# Patient Record
Sex: FEMALE | Race: BLACK OR AFRICAN AMERICAN
[De-identification: names, ages, dates, MRNs, and addresses within clinical notes are randomized per-mention and may not be internally consistent; named-entity substitution may affect disease eponyms.]

---

## 2017-03-24 ENCOUNTER — HOSPITAL ENCOUNTER (INPATIENT)
Dept: HOSPITAL 84 - D.M2 | Age: 57
LOS: 2 days | Discharge: HOME | DRG: 312 | End: 2017-03-26
Attending: FAMILY MEDICINE | Admitting: FAMILY MEDICINE
Payer: COMMERCIAL

## 2017-03-24 VITALS — DIASTOLIC BLOOD PRESSURE: 96 MMHG | SYSTOLIC BLOOD PRESSURE: 142 MMHG

## 2017-03-24 VITALS — BODY MASS INDEX: 28.7 KG/M2 | HEIGHT: 68 IN | WEIGHT: 189.4 LBS | BODY MASS INDEX: 28.7 KG/M2

## 2017-03-24 VITALS — DIASTOLIC BLOOD PRESSURE: 91 MMHG | SYSTOLIC BLOOD PRESSURE: 153 MMHG

## 2017-03-24 DIAGNOSIS — R94.31: ICD-10-CM

## 2017-03-24 DIAGNOSIS — I10: ICD-10-CM

## 2017-03-24 DIAGNOSIS — I07.1: ICD-10-CM

## 2017-03-24 DIAGNOSIS — Z72.0: ICD-10-CM

## 2017-03-24 DIAGNOSIS — R09.89: ICD-10-CM

## 2017-03-24 DIAGNOSIS — E87.6: ICD-10-CM

## 2017-03-24 DIAGNOSIS — M32.9: ICD-10-CM

## 2017-03-24 DIAGNOSIS — R55: Primary | ICD-10-CM

## 2017-03-24 LAB
ANION GAP SERPL CALC-SCNC: 12.5 MMOL/L (ref 8–16)
APTT BLD: 30.7 SECONDS (ref 22.8–39.4)
BASOPHILS NFR BLD AUTO: 0.2 % (ref 0–2)
BUN SERPL-MCNC: 21 MG/DL (ref 7–18)
CALCIUM SERPL-MCNC: 8.6 MG/DL (ref 8.5–10.1)
CHLORIDE SERPL-SCNC: 105 MMOL/L (ref 98–107)
CK MB SERPL-MCNC: 0.5 U/L (ref 0–3.6)
CK SERPL-CCNC: 83 UL (ref 21–215)
CO2 SERPL-SCNC: 27.6 MMOL/L (ref 21–32)
CREAT SERPL-MCNC: 0.9 MG/DL (ref 0.6–1.3)
EOSINOPHIL NFR BLD: 3.4 % (ref 0–7)
ERYTHROCYTE [DISTWIDTH] IN BLOOD BY AUTOMATED COUNT: 17.3 % (ref 11.5–14.5)
GLUCOSE SERPL-MCNC: 123 MG/DL (ref 74–106)
HCT VFR BLD CALC: 32.4 % (ref 36–48)
HGB BLD-MCNC: 10.5 G/DL (ref 12–16)
IMM GRANULOCYTES NFR BLD: 0.2 % (ref 0–5)
INR PPP: 1.03 (ref 0.85–1.17)
LYMPHOCYTES NFR BLD AUTO: 41.8 % (ref 15–50)
MCH RBC QN AUTO: 23.4 PG (ref 26–34)
MCHC RBC AUTO-ENTMCNC: 32.4 G/DL (ref 31–37)
MCV RBC: 72.2 FL (ref 80–100)
MONOCYTES NFR BLD: 2.4 % (ref 2–11)
NEUTROPHILS NFR BLD AUTO: 52 % (ref 40–80)
OSMOLALITY SERPL CALC.SUM OF ELEC: 286 MOSM/KG (ref 275–300)
PLATELET # BLD: 387 10X3/UL (ref 130–400)
PMV BLD AUTO: 8.6 FL (ref 7.4–10.4)
POTASSIUM SERPL-SCNC: 3.1 MMOL/L (ref 3.5–5.1)
PROTHROMBIN TIME: 13.3 SECONDS (ref 11.6–15)
RBC # BLD AUTO: 4.49 10X6/UL (ref 4–5.4)
SODIUM SERPL-SCNC: 142 MMOL/L (ref 136–145)
TROPONIN I SERPL-MCNC: < 0.017 NG/ML (ref 0–0.06)
WBC # BLD AUTO: 6.2 10X3/UL (ref 4.8–10.8)

## 2017-03-24 NOTE — NUR
ATTEMPTED TO SITE PT PIV X2 STICKS. NO SUCCESS. ASKED ANOTHER RN ON FLOOR TO
TRY.
 
TOLD PT ABOUT DR ODOM ORDERING MEDICATION SOON. SHE IS VERY UPSET. SCREAMING
AT ME, NURSING STAFF. AND HER SISTER IN ROOM. TOLD PT AS SOON AS MEDICATION
WAS AVAILABLE I WOULD GIVE IT.

## 2017-03-24 NOTE — NUR
DR ODOM ORDERED TYLENOL FOR PT. WENT TO PULL AND NO TYLENOL IN EITHER PYXIS.
CALLED PHARM SPOKE TO RICH TO REFILL

## 2017-03-24 NOTE — HP
PATIENT: LOUANN KENNY                                  MEDICAL RECORD: [id number]
ACCOUNT: [id number]                                    LOCATION:[address]
: 60                                            ADMISSION DATE: 17
                                                         
 
                             HISTORY AND PHYSICAL EXAMINATION
 
 
HISTORY OF PRESENT ILLNESS:  A 57-year-old -American female presents with
a complaint of syncopal episode yesterday.  She states she was out 7-10 minutes.
 She called 911 when she aroused.  Reported a delayed response.  She began to
feel better, called back and cancel.  She does not remember any prior episodes. 
She has a history of systemic lupus erythematosus, hypertensive disorder.
 
CURRENT MEDICATIONS:  Folic acid, hydroxychloroquine,
losartan/hydrochlorothiazide 100/25 one daily, prednisone 5 mg daily,
promethazine 25 mg p.r.n. nausea.
 
ALLERGIES:  PENICILLIN AND SULFA DRUGS.
 
PAST SURGICAL HISTORY:  Denies any significant family history.
 
SOCIAL HISTORY:  Daily smoker, 3-4 cigarettes daily.
 
REVIEW OF SYSTEMS:
GENERAL:  No acute change in weight or appetite.
HEENT:  No cephalgia, visual changes, tinnitus, epistaxis or dysphagia.
CARDIOVASCULAR:  Denies chest pain, does admit recent syncopal episodes,
multiple risk factors.
PULMONARY:  Denies hemoptysis, denies night sweats.
GASTROINTESTINAL:  Denies hematemesis, hematochezia or melena.
GENITOURINARY:  Denies dysuria, denies change in frequency.
MUSCULOSKELETAL:  No acute changes.
ENDOCRINE:  Denies polyuria, polydipsia, or polyphagia.
 
PHYSICAL EXAMINATION:
VITAL SIGNS:  Height 5 feet 8 inches, weight 191 pounds, BMI is 29, blood
pressure 148/90.  Orthostatics obtained 144/90 supine, 126/78 seated, 140/90
standing, heart rate 64, respirations 18, and temperature 98.1.
GENERAL:  Alert, oriented, anxious.
HEENT:  Normocephalic, atraumatic.  Eyes:  Pupils are equally round and reactive
to light and accommodation.  Extraocular muscles intact.  Conjunctivae not
injected.  Ears:  Canals patent, TMs are intact.  Nose:  Nares patent without
drainage.  Throat:  No erythema, no exudates.
NECK:  Supple.  No lymphadenopathy, no JVD.  Bilateral carotid bruit present.
HEART:  Regular rate and rhythm.  No S3, S4, no rub.
LUNGS:  Clear to auscultation bilaterally.  Breathing is nonlabored.
ABDOMEN:  Soft, nontender.  Bowel sounds all 4 quadrants.
EXTREMITIES:  Present times 4, no edema.
NEUROLOGICAL:  Cranial nerves II-XII grossly intact with no present focal
deficits.
SKIN:  Warm and dry.  No rash.
 
LABORATORY DATA:  EKG shows sinus arrhythmia, rate of 64, moderate extensive
precordial repolarization disturbance, negative T in V5, negative T in V2, V3,
V4, V6, abnormal EKG.
 
ASSESSMENT AND PLAN:  Syncope, carotid bruits, abnormal EKG, systemic lupus
 
 
 
HISTORY AND PHYSICAL                           [id number]    LOUANN KENNY          
 
 
erythematosus, hypertension.  The patient was admitted.  CT of the head without
contrast, CK-MB, troponin, BMP, CBC on admission.  Echo Dr. Chambers to read,
carotid ultrasound, PT, PTT, INR on admission, Plavix 75 mg daily, aspirin 81 mg
daily, losartan/hydrochlorothiazide 100/25 one p.o. daily, prednisone 5 mg daily
IV normal saline TKO, vital signs routine with telemetry, consult cardiology. 
CBC, chemistry in a.m.  Clear liquid diet, n.p.o. after midnight.
 
TRANSINT:[id number] Voice Confirmation ID: [id number] DOCUMENT ID: [id number]
 
 
                                           
                                           FREDDY BOWEN DO            
 
 
 
Electronically Signed by FREDDY LOPEZ on 17 at 1808
 
 
 
 
 
 
 
 
 
 
 
 
 
 
 
 
 
 
 
 
 
 
 
 
 
 
 
 
 
 
 
CC:                                                             [id number]
DICTATION DATE: 17 1528     :     17 1617      ADM IN  
                                                                              
[facility]                                          
[address] [id number]

## 2017-03-24 NOTE — NUR
PAGED DR ODOM ON CALL FOR DR JUNG. PT REQUESTING NAPROXEN FOR HA. HE SAID HE
WOULD LOOK THROUGH HER CHART AND PUT IN AN ORDER.

## 2017-03-25 VITALS — DIASTOLIC BLOOD PRESSURE: 89 MMHG | SYSTOLIC BLOOD PRESSURE: 135 MMHG

## 2017-03-25 VITALS — DIASTOLIC BLOOD PRESSURE: 85 MMHG | SYSTOLIC BLOOD PRESSURE: 138 MMHG

## 2017-03-25 VITALS — DIASTOLIC BLOOD PRESSURE: 80 MMHG | SYSTOLIC BLOOD PRESSURE: 131 MMHG

## 2017-03-25 VITALS — SYSTOLIC BLOOD PRESSURE: 183 MMHG | DIASTOLIC BLOOD PRESSURE: 78 MMHG

## 2017-03-25 VITALS — DIASTOLIC BLOOD PRESSURE: 79 MMHG | SYSTOLIC BLOOD PRESSURE: 116 MMHG

## 2017-03-25 VITALS — DIASTOLIC BLOOD PRESSURE: 94 MMHG | SYSTOLIC BLOOD PRESSURE: 146 MMHG

## 2017-03-25 LAB
ANION GAP SERPL CALC-SCNC: 11.1 MMOL/L (ref 8–16)
BASOPHILS NFR BLD AUTO: 0.2 % (ref 0–2)
BUN SERPL-MCNC: 16 MG/DL (ref 7–18)
CALCIUM SERPL-MCNC: 8.6 MG/DL (ref 8.5–10.1)
CHLORIDE SERPL-SCNC: 105 MMOL/L (ref 98–107)
CO2 SERPL-SCNC: 30.4 MMOL/L (ref 21–32)
CREAT SERPL-MCNC: 0.8 MG/DL (ref 0.6–1.3)
EOSINOPHIL NFR BLD: 4 % (ref 0–7)
ERYTHROCYTE [DISTWIDTH] IN BLOOD BY AUTOMATED COUNT: 17.5 % (ref 11.5–14.5)
GLUCOSE SERPL-MCNC: 87 MG/DL (ref 74–106)
HCT VFR BLD CALC: 32.7 % (ref 36–48)
HGB BLD-MCNC: 10.6 G/DL (ref 12–16)
IMM GRANULOCYTES NFR BLD: 0 % (ref 0–5)
LYMPHOCYTES NFR BLD AUTO: 47.6 % (ref 15–50)
MCH RBC QN AUTO: 23.5 PG (ref 26–34)
MCHC RBC AUTO-ENTMCNC: 32.4 G/DL (ref 31–37)
MCV RBC: 72.3 FL (ref 80–100)
MONOCYTES NFR BLD: 3.2 % (ref 2–11)
NEUTROPHILS NFR BLD AUTO: 45 % (ref 40–80)
OSMOLALITY SERPL CALC.SUM OF ELEC: 284 MOSM/KG (ref 275–300)
PLATELET # BLD: 337 10X3/UL (ref 130–400)
PMV BLD AUTO: 8.2 FL (ref 7.4–10.4)
POTASSIUM SERPL-SCNC: 3.5 MMOL/L (ref 3.5–5.1)
RBC # BLD AUTO: 4.52 10X6/UL (ref 4–5.4)
SODIUM SERPL-SCNC: 143 MMOL/L (ref 136–145)
WBC # BLD AUTO: 4.9 10X3/UL (ref 4.8–10.8)

## 2017-03-25 NOTE — NUR
0715- PT LAYING IN BED ON RIGHT SIDE WITH EYES CLOSED RESTING. ON MONITOR
SHOWING SB, HR 50. ON ROOM AIR. IV SEEN TO LEFT HAND THAT IS CURRENLTY SALINE
LOCKED. NPO CURRENLTY AS ORDERED. NO NEED AT CURRENT TIME. WILL CONTINUE TO
MONITOR AND CONTINUE WITH PLAN OF CARE.

## 2017-03-25 NOTE — NUR
1300-
PT IS WANTING TO KNOW IF SHE CAN EAT SOMETHING. PT IS CURRENTLY NPO AS
ORDERED JUST INCASE PT NEEDS ANY TEST DONE PER REPORT FROM NIGHT SHIFT NURSE
[place]. PAGED DR. JUNG AND SPOKE WITH  FROM ANSWERING SERVICE. 
STATED SHE WOULD SEND MESSAGE TO DR. ODOM WHO IS ON CALL.
1305- RECEIVED CALLBACK FROM DR. ODOM. INFORMED DR. ODOM THAT PT IS WANTING
SOMETHING TO EAT. DR. ODOM SAID AS LONG AS DR. MCGARRY HAS SEEN HER AND NO NEW
ORDERS ARE PUT IN FOR ANY TEST/PROCEDURE THAN HE IS FINE WITH HER HAVING A
REGULAR DIET. I INFORMED DR. ODOM THAT DR. MCGARRY HAD SEEN PT THIS MORNING AND
I HAVE NOT RECEIVED ANY NEW ORDER FOR TEST/PROCEDURE. WILL PUT IN A DIET ORDER
AND CONTINUE TO MONITOR.

## 2017-03-26 VITALS — DIASTOLIC BLOOD PRESSURE: 83 MMHG | SYSTOLIC BLOOD PRESSURE: 136 MMHG

## 2017-03-26 VITALS — SYSTOLIC BLOOD PRESSURE: 169 MMHG | DIASTOLIC BLOOD PRESSURE: 98 MMHG

## 2017-03-26 VITALS — DIASTOLIC BLOOD PRESSURE: 75 MMHG | SYSTOLIC BLOOD PRESSURE: 110 MMHG

## 2017-03-26 NOTE — NUR
PT AOX4 RESP EVEN AND NONLABORED IV TO LEFT HAND PATENT AND INTACT. PT DENIES
NEEDS AT THIS TIME BED AT LOWEST SETTING CALL LIGHT WITH IN REACH WILL
CONTINUE TO MONITOR

## 2017-03-26 NOTE — NUR
RESTING IN BED WITH NO DISTRESS. PIV SALINE LOCKED IN LEFT HAND. NO DISTRESS.
SR PER TELEMETRY. CPOC. CALL LIGHT IN REACH.

## 2017-03-31 NOTE — EC
PATIENT:LOUANN KENNY                  DATE OF SERVICE: 03/24/17
SEX: F                                  MEDICAL RECORD: [id number]
DATE OF BIRTH: [birth year]                        LOCATION:D.      D[address]
AGE OF PATIENT: 57                             ADMISSION DATE: 03/24/17
 
REFERRING PHYSICIAN:                               
 
INTERPRETING PHYSICIAN: CHARLOTTE CHAMBERS M.D.          
 
 
 
                             ECHOCARDIOGRAM REPORT
  ECHO CHARGES 4               ECHO COMPLETE            
 
 
 
CLINICAL DIAGNOSIS: CHF                           
 
                         ECHOCARDIOGRAPHIC MEASUREMENTS
      (adult normal given)
        AC root (d.<3.7cm) 3.5    LV Septum d (<1.2 cm> 1.5 
           Valve Excursion 1.9      LV Septum (systole) 1.9 
     Left Atria (s.<4.0cm> 4.1           LVPW d(<1.2cm) 1.6 
             RV (d.<2.3cm) 3.9            LVPW (sytole) 2.2 
       LV diastole(<5.6CM) 4.4        MV E-F(>70mm/sec)     
                LV systole 2.3            LVOT Diameter 1.9 
            MV exc.(>10mm) 1.1 
Est.ejection fraction (50-75%)     Pericardial Effusion N
 
   DOPPLER:
     LVIT       A 73.0 E 77.0
       LA      RVSP 22  
     LVOT 107  AOP1/2T     
  Asc. Ao 147 
     RVOT 106 
       RA     
        
 AV Gradient Peak 8.59  AV Mean 4.71  AV Area 2.2 
 MV Gradient Peak 3.55  MV Mean 1.36  MV Area     
   COMMENTS:                                              
 
 
 Cardiac Sonographer: Radha NGUYEN              
      Cardiologist:2          Dr. Chambers                
             TAPE# [email or web address]           
                                                                                     
 
 
DATE OF SERVICE:  03/25/2017
 
REFERRING PHYSICIAN:  Kody Gillette DO
 
INDICATION:  Congestive heart failure.
 
DESCRIPTION:  Left ventricle demonstrates left ventricular hypertrophy.  No wall
motion abnormalities are noted.  Estimated ejection fraction is 60%.  Mitral
valve is structurally normal.  There is no regurgitation or prolapse seen.  Left
atrium is mildly dilated.  The aortic valve is trileaflet.  There is no stenosis
 
 
 
ECHOCARDIOGRAM REPORT                          [id number]    LOUANN KENNY          
 
 
or regurgitation seen.  Right ventricle is mildly dilated.  Tricuspid valve is
structurally normal.  There is mild regurgitation noted.  Right atrium is normal
size.  There is no pericardial effusion seen.
 
IMPRESSION:
1.  Left ventricular hypertrophy with preserved ejection fraction of 60%.
2.  Mild tricuspid regurgitation.
 
TRANSINT:[id number] Voice Confirmation ID: [id number] DOCUMENT ID: [id number]
                                           
                                           CHARLOTTE CHAMBERS M.D.          
 
 
 
Electronically Signed by CHARLOTTE CHAMBERS on 03/31/17 at 1454
 
 
 
 
 
 
 
 
 
 
 
 
 
 
 
 
 
 
 
 
 
 
 
 
 
 
 
 
 
 
 
CC:                                                             [id number]
DICTATION DATE: 03/26/17 0748     :     03/26/17 1210      DIS IN  
                                                                      03/26/17
[facility]                                          
[address] [place] [address]

## 2018-03-22 ENCOUNTER — HOSPITAL ENCOUNTER (EMERGENCY)
Dept: HOSPITAL 84 - D.ER | Age: 58
Discharge: HOME | End: 2018-03-22
Payer: COMMERCIAL

## 2018-03-22 VITALS — BODY MASS INDEX: 28.8 KG/M2

## 2018-03-22 DIAGNOSIS — F17.200: ICD-10-CM

## 2018-03-22 DIAGNOSIS — T38.0X5A: Primary | ICD-10-CM
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
## 2018-05-25 ENCOUNTER — HOSPITAL ENCOUNTER (OUTPATIENT)
Dept: HOSPITAL 84 - D.MAMMO | Age: 58
Discharge: HOME | End: 2018-05-25
Attending: FAMILY MEDICINE
Payer: COMMERCIAL

## 2018-05-25 VITALS — BODY MASS INDEX: 28.8 KG/M2

## 2018-05-25 DIAGNOSIS — Z12.31: Primary | ICD-10-CM

## 2018-07-24 ENCOUNTER — HOSPITAL ENCOUNTER (EMERGENCY)
Dept: HOSPITAL 84 - D.ER | Age: 58
Discharge: HOME | End: 2018-07-24
Payer: COMMERCIAL

## 2018-07-24 VITALS — SYSTOLIC BLOOD PRESSURE: 147 MMHG | DIASTOLIC BLOOD PRESSURE: 102 MMHG

## 2018-07-24 VITALS — BODY MASS INDEX: 27.34 KG/M2 | WEIGHT: 180.38 LBS | HEIGHT: 68 IN

## 2018-07-24 DIAGNOSIS — M32.9: ICD-10-CM

## 2018-07-24 DIAGNOSIS — R07.81: Primary | ICD-10-CM

## 2018-07-24 DIAGNOSIS — I10: ICD-10-CM

## 2018-07-24 LAB
ALBUMIN SERPL-MCNC: 3.1 G/DL (ref 3.4–5)
ALP SERPL-CCNC: 92 U/L (ref 46–116)
ALT SERPL-CCNC: 15 U/L (ref 10–68)
ANION GAP SERPL CALC-SCNC: 13.9 MMOL/L (ref 8–16)
APTT BLD: 30.6 SECONDS (ref 22.8–39.4)
BASOPHILS NFR BLD AUTO: 0.2 % (ref 0–2)
BILIRUB SERPL-MCNC: 0.53 MG/DL (ref 0.2–1.3)
BUN SERPL-MCNC: 8 MG/DL (ref 7–18)
CALCIUM SERPL-MCNC: 8.4 MG/DL (ref 8.5–10.1)
CHLORIDE SERPL-SCNC: 102 MMOL/L (ref 98–107)
CK MB SERPL-MCNC: 0.2 U/L (ref 0–3.6)
CO2 SERPL-SCNC: 25.4 MMOL/L (ref 21–32)
CREAT SERPL-MCNC: 0.8 MG/DL (ref 0.6–1.3)
EOSINOPHIL NFR BLD: 2.7 % (ref 0–7)
ERYTHROCYTE [DISTWIDTH] IN BLOOD BY AUTOMATED COUNT: 16.1 % (ref 11.5–14.5)
GLOBULIN SER-MCNC: 4.6 G/L
GLUCOSE SERPL-MCNC: 94 MG/DL (ref 74–106)
HCT VFR BLD CALC: 31.5 % (ref 36–48)
HGB BLD-MCNC: 10.5 G/DL (ref 12–16)
IMM GRANULOCYTES NFR BLD: 0.2 % (ref 0–5)
INR PPP: 1.15 (ref 0.85–1.17)
LIPASE SERPL-CCNC: 129 U/L (ref 73–393)
LYMPHOCYTES NFR BLD AUTO: 41.8 % (ref 15–50)
MCH RBC QN AUTO: 22.7 PG (ref 26–34)
MCHC RBC AUTO-ENTMCNC: 33.3 G/DL (ref 31–37)
MCV RBC: 68 FL (ref 80–100)
MONOCYTES NFR BLD: 3.6 % (ref 2–11)
NEUTROPHILS NFR BLD AUTO: 51.5 % (ref 40–80)
OSMOLALITY SERPL CALC.SUM OF ELEC: 273 MOSM/KG (ref 275–300)
PLATELET # BLD: 400 10X3/UL (ref 130–400)
PMV BLD AUTO: 8.8 FL (ref 7.4–10.4)
POTASSIUM SERPL-SCNC: 3.3 MMOL/L (ref 3.5–5.1)
PROT SERPL-MCNC: 7.7 G/DL (ref 6.4–8.2)
PROTHROMBIN TIME: 14 SECONDS (ref 11.6–15)
RBC # BLD AUTO: 4.63 10X6/UL (ref 4–5.4)
SODIUM SERPL-SCNC: 138 MMOL/L (ref 136–145)
TROPONIN I SERPL-MCNC: < 0.017 NG/ML (ref 0–0.06)
WBC # BLD AUTO: 4.8 10X3/UL (ref 4.8–10.8)

## 2019-03-29 ENCOUNTER — HOSPITAL ENCOUNTER (OUTPATIENT)
Dept: HOSPITAL 84 - D.MAMMO | Age: 59
Discharge: HOME | End: 2019-03-29
Attending: INTERNAL MEDICINE
Payer: COMMERCIAL

## 2019-03-29 VITALS — BODY MASS INDEX: 27.4 KG/M2

## 2019-03-29 DIAGNOSIS — Z12.31: Primary | ICD-10-CM

## 2020-06-23 ENCOUNTER — HOSPITAL ENCOUNTER (OUTPATIENT)
Dept: HOSPITAL 84 - D.MAMMO | Age: 60
Discharge: HOME | End: 2020-06-23
Attending: FAMILY MEDICINE
Payer: COMMERCIAL

## 2020-06-23 VITALS — BODY MASS INDEX: 27.4 KG/M2

## 2020-06-23 DIAGNOSIS — Z12.31: Primary | ICD-10-CM
